# Patient Record
Sex: MALE | Race: WHITE | NOT HISPANIC OR LATINO | Employment: OTHER | URBAN - METROPOLITAN AREA
[De-identification: names, ages, dates, MRNs, and addresses within clinical notes are randomized per-mention and may not be internally consistent; named-entity substitution may affect disease eponyms.]

---

## 2017-03-09 PROBLEM — G89.29 CHRONIC PAIN: Status: ACTIVE | Noted: 2017-03-09

## 2017-03-09 PROBLEM — G47.33 OSA (OBSTRUCTIVE SLEEP APNEA): Status: ACTIVE | Noted: 2017-03-09

## 2017-03-09 PROBLEM — N18.30 CKD (CHRONIC KIDNEY DISEASE) STAGE 3, GFR 30-59 ML/MIN: Status: ACTIVE | Noted: 2017-03-09

## 2017-03-09 PROBLEM — D69.6 THROMBOCYTOPENIA (HCC): Status: ACTIVE | Noted: 2017-03-09

## 2017-03-09 PROBLEM — R09.02 HYPOXEMIA: Status: ACTIVE | Noted: 2017-03-09

## 2017-03-10 PROBLEM — R09.02 HYPOXEMIA: Status: RESOLVED | Noted: 2017-03-09 | Resolved: 2017-03-10

## 2017-04-11 PROBLEM — M75.100 ROTATOR CUFF TEAR: Status: ACTIVE | Noted: 2017-04-11

## 2017-11-15 PROBLEM — Z47.89 ORTHOPEDIC AFTERCARE: Status: ACTIVE | Noted: 2017-11-15

## 2018-01-11 PROBLEM — M17.12 PRIMARY OSTEOARTHRITIS OF LEFT KNEE: Status: ACTIVE | Noted: 2018-01-11

## 2018-05-10 PROBLEM — K57.10 DUODENAL DIVERTICULUM: Status: ACTIVE | Noted: 2018-05-10

## 2018-05-16 PROBLEM — R10.9 ABDOMINAL PAIN: Status: ACTIVE | Noted: 2018-05-16

## 2018-05-17 RX ORDER — DULOXETIN HYDROCHLORIDE 20 MG/1
20 CAPSULE, DELAYED RELEASE ORAL DAILY
Status: ON HOLD | COMMUNITY
End: 2018-07-11

## 2018-05-17 RX ORDER — ONDANSETRON 2 MG/ML
4 INJECTION INTRAMUSCULAR; INTRAVENOUS EVERY 8 HOURS PRN
Status: ON HOLD | COMMUNITY
End: 2018-05-25

## 2018-05-17 RX ORDER — LORAZEPAM 1 MG/1
1 TABLET ORAL EVERY 8 HOURS PRN
COMMUNITY
End: 2018-07-30

## 2018-05-17 RX ORDER — MORPHINE SULFATE 4 MG/ML
2-4 INJECTION, SOLUTION INTRAMUSCULAR; INTRAVENOUS EVERY 4 HOURS PRN
Status: ON HOLD | COMMUNITY
End: 2018-05-25

## 2018-05-17 RX ORDER — BISACODYL 10 MG
10 SUPPOSITORY, RECTAL RECTAL PRN
Status: ON HOLD | COMMUNITY
End: 2018-05-25

## 2018-05-17 RX ORDER — METOCLOPRAMIDE HYDROCHLORIDE 5 MG/ML
5-10 INJECTION INTRAMUSCULAR; INTRAVENOUS EVERY 8 HOURS
Status: ON HOLD | COMMUNITY
End: 2018-05-25

## 2018-05-17 RX ORDER — LOPERAMIDE HYDROCHLORIDE 2 MG/1
2 TABLET ORAL 4 TIMES DAILY PRN
COMMUNITY
End: 2018-07-30

## 2018-05-17 NOTE — OR NURSING
Pre-admit appointment completed.Pt is currently in house at [facility] Room 218. Pt states [facility] is transporting him to [place] via ambulance ride for ERCP. Pt states he will discuss with his MD tonight low platelets. States his MD said he may or may not need transfusion before/after procedure.     Spoke with nurse, Veronika, and informed of above. She will fax current MAR and send MAR with pt tomorrow to show meds taken.

## 2018-05-18 ENCOUNTER — APPOINTMENT (OUTPATIENT)
Dept: RADIOLOGY | Facility: MEDICAL CENTER | Age: 68
End: 2018-05-18
Attending: INTERNAL MEDICINE
Payer: OTHER GOVERNMENT

## 2018-05-18 ENCOUNTER — HOSPITAL ENCOUNTER (OUTPATIENT)
Facility: MEDICAL CENTER | Age: 68
End: 2018-05-18
Attending: INTERNAL MEDICINE | Admitting: INTERNAL MEDICINE
Payer: OTHER GOVERNMENT

## 2018-05-18 VITALS
OXYGEN SATURATION: 97 % | HEART RATE: 89 BPM | RESPIRATION RATE: 18 BRPM | SYSTOLIC BLOOD PRESSURE: 167 MMHG | DIASTOLIC BLOOD PRESSURE: 98 MMHG | TEMPERATURE: 97.3 F | BODY MASS INDEX: 26.09 KG/M2 | HEIGHT: 67 IN | WEIGHT: 166.23 LBS

## 2018-05-18 LAB — LIPASE SERPL-CCNC: 21 U/L (ref 7–58)

## 2018-05-18 PROCEDURE — 36415 COLL VENOUS BLD VENIPUNCTURE: CPT

## 2018-05-18 PROCEDURE — 500066 HCHG BITE BLOCK, ECT: Performed by: INTERNAL MEDICINE

## 2018-05-18 PROCEDURE — 83690 ASSAY OF LIPASE: CPT

## 2018-05-18 PROCEDURE — 160036 HCHG PACU - EA ADDL 30 MINS PHASE I: Performed by: INTERNAL MEDICINE

## 2018-05-18 PROCEDURE — 700111 HCHG RX REV CODE 636 W/ 250 OVERRIDE (IP): Performed by: INTERNAL MEDICINE

## 2018-05-18 PROCEDURE — 700105 HCHG RX REV CODE 258: Performed by: INTERNAL MEDICINE

## 2018-05-18 PROCEDURE — 160009 HCHG ANES TIME/MIN: Performed by: INTERNAL MEDICINE

## 2018-05-18 PROCEDURE — 700111 HCHG RX REV CODE 636 W/ 250 OVERRIDE (IP)

## 2018-05-18 PROCEDURE — 160002 HCHG RECOVERY MINUTES (STAT): Performed by: INTERNAL MEDICINE

## 2018-05-18 PROCEDURE — 74330 X-RAY BILE/PANC ENDOSCOPY: CPT

## 2018-05-18 PROCEDURE — 160035 HCHG PACU - 1ST 60 MINS PHASE I: Performed by: INTERNAL MEDICINE

## 2018-05-18 PROCEDURE — 160203 HCHG ENDO MINUTES - 1ST 30 MINS LEVEL 4: Performed by: INTERNAL MEDICINE

## 2018-05-18 PROCEDURE — 700117 HCHG RX CONTRAST REV CODE 255

## 2018-05-18 PROCEDURE — 160048 HCHG OR STATISTICAL LEVEL 1-5: Performed by: INTERNAL MEDICINE

## 2018-05-18 PROCEDURE — 700105 HCHG RX REV CODE 258

## 2018-05-18 PROCEDURE — 160208 HCHG ENDO MINUTES - EA ADDL 1 MIN LEVEL 4: Performed by: INTERNAL MEDICINE

## 2018-05-18 PROCEDURE — 110371 HCHG SHELL REV 272: Performed by: INTERNAL MEDICINE

## 2018-05-18 RX ORDER — ONDANSETRON 2 MG/ML
INJECTION INTRAMUSCULAR; INTRAVENOUS
Status: COMPLETED
Start: 2018-05-18 | End: 2018-05-18

## 2018-05-18 RX ORDER — HYDRALAZINE HYDROCHLORIDE 20 MG/ML
INJECTION INTRAMUSCULAR; INTRAVENOUS
Status: COMPLETED
Start: 2018-05-18 | End: 2018-05-18

## 2018-05-18 RX ORDER — SODIUM CHLORIDE, SODIUM LACTATE, POTASSIUM CHLORIDE, CALCIUM CHLORIDE 600; 310; 30; 20 MG/100ML; MG/100ML; MG/100ML; MG/100ML
INJECTION, SOLUTION INTRAVENOUS
Status: DISCONTINUED | OUTPATIENT
Start: 2018-05-18 | End: 2018-05-18 | Stop reason: HOSPADM

## 2018-05-18 RX ORDER — HYDROMORPHONE HYDROCHLORIDE 2 MG/ML
INJECTION, SOLUTION INTRAMUSCULAR; INTRAVENOUS; SUBCUTANEOUS
Status: COMPLETED
Start: 2018-05-18 | End: 2018-05-18

## 2018-05-18 RX ADMIN — FENTANYL CITRATE 50 MCG: 50 INJECTION, SOLUTION INTRAMUSCULAR; INTRAVENOUS at 16:49

## 2018-05-18 RX ADMIN — ONDANSETRON 4 MG: 2 INJECTION INTRAMUSCULAR; INTRAVENOUS at 16:45

## 2018-05-18 RX ADMIN — HYDROMORPHONE HYDROCHLORIDE 0.5 MG: 2 INJECTION, SOLUTION INTRAMUSCULAR; INTRAVENOUS; SUBCUTANEOUS at 19:16

## 2018-05-18 RX ADMIN — FENTANYL CITRATE 75 MCG: 50 INJECTION, SOLUTION INTRAMUSCULAR; INTRAVENOUS at 18:38

## 2018-05-18 RX ADMIN — HYDROMORPHONE HYDROCHLORIDE 0.5 MG: 2 INJECTION, SOLUTION INTRAMUSCULAR; INTRAVENOUS; SUBCUTANEOUS at 19:29

## 2018-05-18 RX ADMIN — FENTANYL CITRATE 50 MCG: 50 INJECTION, SOLUTION INTRAMUSCULAR; INTRAVENOUS at 17:28

## 2018-05-18 RX ADMIN — PIPERACILLIN AND TAZOBACTAM 3.38 G: 3; .375 INJECTION, POWDER, FOR SOLUTION INTRAVENOUS at 14:18

## 2018-05-18 RX ADMIN — SODIUM CHLORIDE, SODIUM LACTATE, POTASSIUM CHLORIDE, CALCIUM CHLORIDE: 600; 310; 30; 20 INJECTION, SOLUTION INTRAVENOUS at 13:20

## 2018-05-18 RX ADMIN — HYDRALAZINE HYDROCHLORIDE 5 MG: 20 INJECTION, SOLUTION INTRAMUSCULAR; INTRAVENOUS at 17:22

## 2018-05-18 RX ADMIN — FENTANYL CITRATE 25 MCG: 50 INJECTION, SOLUTION INTRAMUSCULAR; INTRAVENOUS at 16:37

## 2018-05-18 RX ADMIN — HYDRALAZINE HYDROCHLORIDE 5 MG: 20 INJECTION, SOLUTION INTRAMUSCULAR; INTRAVENOUS at 18:48

## 2018-05-18 RX ADMIN — HYDROMORPHONE HYDROCHLORIDE 0.5 MG: 2 INJECTION, SOLUTION INTRAMUSCULAR; INTRAVENOUS; SUBCUTANEOUS at 19:05

## 2018-05-18 RX ADMIN — HYDRALAZINE HYDROCHLORIDE 10 MG: 20 INJECTION, SOLUTION INTRAMUSCULAR; INTRAVENOUS at 18:38

## 2018-05-18 RX ADMIN — FENTANYL CITRATE 50 MCG: 50 INJECTION, SOLUTION INTRAMUSCULAR; INTRAVENOUS at 18:00

## 2018-05-18 ASSESSMENT — PAIN SCALES - GENERAL
PAINLEVEL_OUTOF10: 7
PAINLEVEL_OUTOF10: 5
PAINLEVEL_OUTOF10: 8
PAINLEVEL_OUTOF10: 8
PAINLEVEL_OUTOF10: 7
PAINLEVEL_OUTOF10: 8
PAINLEVEL_OUTOF10: 7
PAINLEVEL_OUTOF10: 5
PAINLEVEL_OUTOF10: 8
PAINLEVEL_OUTOF10: 6
PAINLEVEL_OUTOF10: 8
PAINLEVEL_OUTOF10: 8
PAINLEVEL_OUTOF10: 9
PAINLEVEL_OUTOF10: 8
PAINLEVEL_OUTOF10: 8

## 2018-05-18 NOTE — PROCEDURES
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 Prince Tracey (general)    Premedications: zosyn 3.375 grams IV    Consent: Risks, benefits, and alternatives were discussed with patient.  Consenting person was given an opportunity to ask questions and discuss other options.  Risks including but not limited to pancreatitis, contrast reaction, radiation exposure, retained choledocholithiasis, possible need for temporary stent placement, perforation, infection, bleeding, missed lesion(s), cardiac and/or pulmonary event, aspiration, stroke, possible need for surgery and/or interventional radiology, hospitalization possibly prolonged, discomfort, unsuccessful and/or incomplete procedure, ineffective therapy and/or persistent symptoms, possible need for repeat procedures and/or additional testings, damage to adjacent organs and/or vascular structures, medication reaction, disability, death, and other adverse events possibly life-threatening.  Discussion was undertaken with Layman's terms.  Consenting person stated understanding and acceptance of these risks, and wished to proceed.  Informed consent was given in clear state of mind.    Endoscopic procedures in detail:  ERCP scope was inserted from mouth to 2nd portion of the duodenum.  Biliary duct was selectively cannulated on the third attempt, successful free cannulation was achieved.  Cholangiogram was injected and completed.  Biliary sphincterotomy was  "performed with a bow papillotome with subsequent balloon common bile duct stones extraction, clearance was complete without evidence of any residual common bile duct stones on completion cholangiogram. Saline was used to flush the common bile duct to remove fragmented stone debris.  The C-arm was moved and rotated on rainbow axis to optimize imaging of intrahepatic biliary systems in different angles to avoid missing lesions/strictures with ductal overlap and/or image angulation. The balloon was inflated within the right and left intrahepatic ducts and dragged through the entire length of the bile duct out the biliary os in order to minimize risk of retained stones. The ERCP scope was removed from duodenum to also image the common bile duct \"behind\" the ERCP scope.  Of note, no radiologist was present to help obtain nor read ERCP images.  I was the sole physician who obtained and performed interpretation of static and dynamic ERCP fluoroscopic x-ray images, no over-read was requested from the radiologist nor was it necessary.  There was suction of insufflated air and stomach fluid contents upon removal.    Procedure times:  - In-room 15:48  - Start 15:58  - Completed 16:13  - Out of room 16:26    Endoscopic Retrograde CholangioPancreatography (ERCP) Findings:  - Ampulla of Vater: located in 2nd portion of duodenum \"below\" a large duodenal diverticulum.  - Cholangiogram: 9mm common bile duct yellowish-brown stone fragmented with balloon and removed after biliary sphincterotomy was performed, clearance complete without residual stones on completion cholangiogram.  - Pancreatogram: Intentionally not injected nor cannulated.    Impression:  1. Choledocholithiasis, extracted.  2. Biliary sphincterotomy performed  3. Duodenal juxta-ampullary diverticulum    Recommendations:   1.  Routine post-endoscopy anesthesia recovery care.  Transfer patient back to [facility] under the care of Dr. Shyla Woods when he is " awake, alert and comfortable, when recovery criteria are met.  Aspiration and fall precautions x 24 hours.   2. Avoid NSAIDs and blood-thinners for 5 days  3. Clear liquid diet, NPO after midnight for lap cisco.  4. Follow-up with as needed with established GI Dr. Umanzor.   5. I spoke to patient and recovery nurse about impression, diagnosis and recommendations.  6. I asked nursing staff to print my procedure note so that it may accompany paper chart back to [facility] in case they cannot access my note in Epic.

## 2018-05-19 PROBLEM — K80.50 CHOLEDOCHOLITHIASIS: Status: ACTIVE | Noted: 2018-05-19

## 2018-05-19 NOTE — CONSULTS
[facility]    DATE OF SERVICE:  05/18/2018    REFERRING PHYSICIAN:  Shyla Woods MD.    REASON FOR CONSULTATION:  Choledocholithiasis.    HISTORY OF PRESENT ILLNESS:  A 67-year-old  male with intact   gallbladder, presented to [facility] with 7 weeks of epigastric abdominal   pain.  He has described his abdominal pain as sharp in quality, radiating to   his back, associated with nausea, vomiting, fairly severe, as bad as passing a   kidney stone.  His symptoms were worsened with eating fatty foods, especially   hamburgers and French fries.  His pain was colicky in nature, but persistent   for several days prior to admission.  He lost about 15 pounds of weight   unintentionally over the last 2 months due to his abdominal pain symptoms.  He   does have chronic heartburn and reflux symptoms for which he takes   medications and this felt different than those pain symptoms.  He originally   presented to [facility] and a CT scan revealed a distal common biliary   duct filling defect as well as duodenal diverticulum.  His LFTs showed   initially normal AST and ALT, alkaline phosphatase, total bilirubin.    Ultrasound showed gallbladder sludge.  They had plans to proceed with   laparoscopic cholecystectomy after ERCP.  Today, he is transferred from [facility] for his procedure and plans to be transferred back afterwards.    Otherwise, patient denies further modifying factors, associated symptoms, or   timing issues with his complaints.    PAST MEDICAL HISTORY:  Chronic thrombocytopenia, stage III kidney disease,   anxiety, osteoarthritis, GERD, possible history of myocardial infarction at   least 8 years ago if not greater, obstructive sleep apnea, uses CPAP, chronic   leg pain.    PAST SURGICAL HISTORY:  Multiple knee surgeries, shoulder rotator cuff   surgeries, nasal septoplasty, skin grafts.    SOCIAL HISTORY:  Retired .  Denied tobacco or illicit drug   use and  tattoos.  Drinks alcohol rarely.    FAMILY HISTORY:  Positive for coronary artery disease and arthritis.  Denied   family history of gastric cancer, colorectal cancer, colorectal polyps.    REVIEW OF SYSTEMS:  As per HPI, past medical history, past surgical history   sections, otherwise greater than 10 systems negative including constitutional,   head, ears, eyes, nose, throat, pulmonary, cardiovascular, GI, genital,   rectal, hematological, endocrine, rheumatological, psychiatric, neurological,   dermatological, hematological/oncological, musculoskeletal.    PHYSICAL EXAMINATION:  VITAL SIGNS:  Temperature 98.2, respirations 18, pulse 64, blood pressure   154/104, 5 feet 7 inches tall, BMI of 26, weight of 169 pounds.  GENERAL:  A well-developed, well-nourished white male, in no apparent distress   except for abdominal pain symptoms.  HEENT:  Head atraumatic, normocephalic.  Eyes, extraocular movements intact,   nonicteric sclerae.  Nose, no erythema, no discharge.  Mouth, no erythema or   discharge, no thrush noted.  Mallampati of 2.  NECK:  Supple.  No lymphadenopathy or JVD.  PULMONARY:  Clear to auscultation bilaterally.  CARDIOVASCULAR:  Regular rate and rhythm without murmur, rub, or gallop.  ABDOMEN:  Nondistended.  Positive bowel sounds.  Markedly tender at epigastrium.  No   rebound, no appreciable hepatosplenomegaly, ascites, ecchymosis.  DERMATOLOGICAL:  No spider angiomas, no palmar erythema.  NEUROLOGICAL:  No focal deficits appreciated.  No asterixis.  MUSCULOSKELETAL:  Moving all extremities.  Strength 5/5 throughout.  PSYCHIATRIC:  Appropriate mood, affect, interaction, and insight.    LABORATORY DATA:  WBC 4.9, hemoglobin 15.5, hematocrit 44.9, platelet count   69.  Sodium 139, potassium 4, chloride 108, bicarb 26, BUN 10, creatinine 1.6,   glucose 115, GFR 43, calcium 8.4, AST 59, ALT 64, alkaline phosphatase 77,   total bilirubin 0.4, albumin 3.1, magnesium 2, lipase 101.  INR 1.11.  Blood   type  O positive.    IMAGING:  As per HPI.  MRCP, 05/16/2018, shows a 9-mm stone within common   biliary duct.  EKG was performed on 05/04/2018, records unavailable.    IMPRESSION:  A 67-year-old  male with intact gallbladder, presents   with choledocholithiasis without evidence of cholangitis with intractable   epigastric abdominal pain, nausea, vomiting, warranting ERCP prior to   laparoscopic cholecystectomy.    Significant comorbidities include choledocholithiasis, obstructive sleep   apnea, chronic kidney disease and chronic thrombocytopenia, which increase the   patient's risk for bleeding and adverse events, but ERCP is warranted and   patient stated acceptance of these increased risk.    PROBLEMS  1.  Choledocholithiasis.  2.  Abnormal MRCP and CT biliary duct imaging.  3.  Epigastric abdominal pain.  4.  Gallstones, epigastric abdominal pain.  5.  Chronic thrombocytopenia.  6.  Sleep apnea.  7.  Multiple comorbidities as per above.    RECOMMENDATIONS:  1.  Zosyn 3.375 grams IV prior to proceeding with ERCP.  2.  Endoscopic retrograde cholangiopancreatography with biliary sphincterotomy   and choledocholithiasis extraction with possible temporary stent placement.    Plan to proceed under anesthesia support.  Risks, benefits, and alternatives   were discussed with patient.  He was given opportunity to ask questions and   discuss other options.  Risks including but not limited to perforation,   infection, bleeding, missed lesions, possible need for surgery,   cardiopulmonary event, aspiration, stroke, hospitalization possibly prolonged,   pancreatitis, contrast reaction, radiation exposure, failed to incomplete   ERCP, retained choledocholithiasis, possible need for temporary stent   insertion, stent migration and/or occlusion if inserted, and other potential   life-threatening complications.  Discussion was undertaken in layman's terms.    Patient stated clear understanding and acceptance of risk and  wished to   proceed with procedures as detailed in this note.  Consent was given by   patient in clear state of mind.    Dear Dr. Joe Kelly:    Thank you for asking me to evaluate the patient in consultation.  Please refer   to my consult note as per above for details of recommendations.       ____________________________________     MD FRANDY CORDERO / ADÁN    DD:  05/18/2018 13:50:35  DT:  05/18/2018 14:47:18    D#:  [id number]  Job#:  [id number]    cc: JOE KELLY MD, JOSÉ MIGUEL GRULLON MD

## 2019-04-25 ENCOUNTER — HOSPITAL ENCOUNTER (OUTPATIENT)
Dept: HOSPITAL 8 - STAR | Age: 69
Discharge: HOME | End: 2019-04-25
Attending: ORTHOPAEDIC SURGERY
Payer: MEDICARE

## 2019-04-25 DIAGNOSIS — R79.89: ICD-10-CM

## 2019-04-25 DIAGNOSIS — Z01.818: ICD-10-CM

## 2019-04-25 DIAGNOSIS — T84.84XS: Primary | ICD-10-CM

## 2019-04-25 DIAGNOSIS — Z96.652: ICD-10-CM
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
## 2019-04-25 LAB
<PLATELET ESTIMATE>: (no result)
ANION GAP SERPL CALC-SCNC: 5 MMOL/L (ref 5–15)
ANISOCYTOSIS BLD QL SMEAR: (no result)
APTT BLD: 29 SECONDS (ref 25–31)
BASOPHILS # BLD AUTO: 0.03 X10^3/UL (ref 0–0.1)
BASOPHILS NFR BLD AUTO: 1 % (ref 0–1)
CALCIUM SERPL-MCNC: 9.1 MG/DL (ref 8.5–10.1)
CHLORIDE SERPL-SCNC: 108 MMOL/L (ref 98–107)
CREAT SERPL-MCNC: 1.61 MG/DL (ref 0.7–1.3)
CULTURE INDICATED?: NO
EOSINOPHIL # BLD AUTO: 0.06 X10^3/UL (ref 0–0.4)
EOSINOPHIL NFR BLD AUTO: 1 % (ref 1–7)
ERYTHROCYTE [DISTWIDTH] IN BLOOD BY AUTOMATED COUNT: 15.1 % (ref 9.4–14.8)
EST. AVERAGE GLUCOSE BLD GHB EST-MCNC: 114 MG/DL (ref 0–126)
HBA1C MFR BLD: 5.6 % (ref 4.2–6.3)
INR PPP: 1.08 (ref 0.93–1.1)
LG PLATELETS BLD QL SMEAR: (no result)
LYMPHOCYTES # BLD AUTO: 0.77 X10^3/UL (ref 1–3.4)
LYMPHOCYTES NFR BLD AUTO: 15 % (ref 22–44)
MCH RBC QN AUTO: 32 PG (ref 27.5–34.5)
MCHC RBC AUTO-ENTMCNC: 33.8 G/DL (ref 33.2–36.2)
MCV RBC AUTO: 94.6 FL (ref 81–97)
MD: (no result)
MICROSCOPIC: (no result)
MONOCYTES # BLD AUTO: 0.51 X10^3/UL (ref 0.2–0.8)
MONOCYTES NFR BLD AUTO: 10 % (ref 2–9)
NEUTROPHILS # BLD AUTO: 3.82 X10^3/UL (ref 1.8–6.8)
NEUTROPHILS NFR BLD AUTO: 74 % (ref 42–75)
PLATELET # BLD AUTO: 64 X10^3/UL (ref 130–400)
PMV BLD AUTO: 10 FL (ref 7.4–10.4)
PROTHROMBIN TIME: 11.3 SECONDS (ref 9.6–11.5)
RBC # BLD AUTO: 5.58 X10^6/UL (ref 4.38–5.82)

## 2019-04-25 PROCEDURE — 85730 THROMBOPLASTIN TIME PARTIAL: CPT

## 2019-04-25 PROCEDURE — 85025 COMPLETE CBC W/AUTO DIFF WBC: CPT

## 2019-04-25 PROCEDURE — 83036 HEMOGLOBIN GLYCOSYLATED A1C: CPT

## 2019-04-25 PROCEDURE — 81003 URINALYSIS AUTO W/O SCOPE: CPT

## 2019-04-25 PROCEDURE — 93005 ELECTROCARDIOGRAM TRACING: CPT

## 2019-04-25 PROCEDURE — 80048 BASIC METABOLIC PNL TOTAL CA: CPT

## 2019-04-25 PROCEDURE — 85610 PROTHROMBIN TIME: CPT

## 2019-04-25 PROCEDURE — 87081 CULTURE SCREEN ONLY: CPT

## 2019-04-25 PROCEDURE — 36415 COLL VENOUS BLD VENIPUNCTURE: CPT

## 2019-04-25 PROCEDURE — 87806 HIV AG W/HIV1&2 ANTB W/OPTIC: CPT

## 2019-04-25 PROCEDURE — G0475 HIV COMBINATION ASSAY: HCPCS

## 2019-04-29 ENCOUNTER — HOSPITAL ENCOUNTER (INPATIENT)
Dept: HOSPITAL 8 - ORIP | Age: 69
LOS: 1 days | Discharge: HOME | DRG: 468 | End: 2019-04-30
Attending: ORTHOPAEDIC SURGERY | Admitting: ORTHOPAEDIC SURGERY
Payer: MEDICARE

## 2019-04-29 VITALS — WEIGHT: 188.27 LBS | HEIGHT: 67 IN | BODY MASS INDEX: 29.55 KG/M2

## 2019-04-29 VITALS — SYSTOLIC BLOOD PRESSURE: 127 MMHG | DIASTOLIC BLOOD PRESSURE: 79 MMHG

## 2019-04-29 VITALS — SYSTOLIC BLOOD PRESSURE: 128 MMHG | DIASTOLIC BLOOD PRESSURE: 78 MMHG
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
## 2019-04-29 DIAGNOSIS — T84.033A: Primary | ICD-10-CM
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
## 2019-04-29 DIAGNOSIS — G89.29: ICD-10-CM
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
## 2019-04-29 PROCEDURE — 0SRD069 REPLACEMENT OF LEFT KNEE JOINT WITH OXIDIZED ZIRCONIUM ON POLYETHYLENE SYNTHETIC SUBSTITUTE, CEMENTED, OPEN APPROACH: ICD-10-PCS | Performed by: ORTHOPAEDIC SURGERY

## 2019-04-29 PROCEDURE — C1776 JOINT DEVICE (IMPLANTABLE): HCPCS

## 2019-04-29 PROCEDURE — 85014 HEMATOCRIT: CPT

## 2019-04-29 PROCEDURE — 0SPD0JZ REMOVAL OF SYNTHETIC SUBSTITUTE FROM LEFT KNEE JOINT, OPEN APPROACH: ICD-10-PCS | Performed by: ORTHOPAEDIC SURGERY

## 2019-04-29 PROCEDURE — 36415 COLL VENOUS BLD VENIPUNCTURE: CPT

## 2019-04-29 PROCEDURE — C1713 ANCHOR/SCREW BN/BN,TIS/BN: HCPCS

## 2019-04-29 PROCEDURE — 5A09357 ASSISTANCE WITH RESPIRATORY VENTILATION, LESS THAN 24 CONSECUTIVE HOURS, CONTINUOUS POSITIVE AIRWAY PRESSURE: ICD-10-PCS | Performed by: ORTHOPAEDIC SURGERY

## 2019-04-29 PROCEDURE — 85018 HEMOGLOBIN: CPT

## 2019-04-29 PROCEDURE — 3E0T3BZ INTRODUCTION OF ANESTHETIC AGENT INTO PERIPHERAL NERVES AND PLEXI, PERCUTANEOUS APPROACH: ICD-10-PCS | Performed by: ORTHOPAEDIC SURGERY

## 2019-04-29 RX ADMIN — DOCUSATE SODIUM SCH MG: 100 CAPSULE, LIQUID FILLED ORAL at 21:30

## 2019-04-29 RX ADMIN — CEFAZOLIN SODIUM SCH MLS/HR: 1 SOLUTION INTRAVENOUS at 21:30

## 2019-04-29 RX ADMIN — FENTANYL CITRATE PRN MCG: 50 INJECTION INTRAMUSCULAR; INTRAVENOUS at 15:36

## 2019-04-29 RX ADMIN — KETOROLAC TROMETHAMINE SCH MG: 30 INJECTION, SOLUTION INTRAMUSCULAR at 18:21

## 2019-04-29 RX ADMIN — GABAPENTIN SCH MG: 300 CAPSULE ORAL at 21:30

## 2019-04-29 RX ADMIN — HYDROMORPHONE HYDROCHLORIDE PRN MG: 2 INJECTION INTRAMUSCULAR; INTRAVENOUS; SUBCUTANEOUS at 15:47

## 2019-04-29 RX ADMIN — FENTANYL CITRATE PRN MCG: 50 INJECTION INTRAMUSCULAR; INTRAVENOUS at 15:43

## 2019-04-29 RX ADMIN — HYDROMORPHONE HYDROCHLORIDE PRN MG: 2 INJECTION INTRAMUSCULAR; INTRAVENOUS; SUBCUTANEOUS at 15:41

## 2019-04-29 RX ADMIN — DEXTROSE, SODIUM CHLORIDE, AND POTASSIUM CHLORIDE SCH MLS/HR: 5; .45; .15 INJECTION INTRAVENOUS at 18:22

## 2019-04-29 RX ADMIN — OXYCODONE HYDROCHLORIDE PRN MG: 5 TABLET ORAL at 21:30

## 2019-04-30 VITALS — DIASTOLIC BLOOD PRESSURE: 79 MMHG | SYSTOLIC BLOOD PRESSURE: 128 MMHG

## 2019-04-30 VITALS — SYSTOLIC BLOOD PRESSURE: 111 MMHG | DIASTOLIC BLOOD PRESSURE: 72 MMHG

## 2019-04-30 RX ADMIN — GABAPENTIN SCH MG: 300 CAPSULE ORAL at 08:00

## 2019-04-30 RX ADMIN — OXYCODONE HYDROCHLORIDE PRN MG: 5 TABLET ORAL at 12:53

## 2019-04-30 RX ADMIN — DOCUSATE SODIUM SCH MG: 100 CAPSULE, LIQUID FILLED ORAL at 08:01

## 2019-04-30 RX ADMIN — CEFAZOLIN SODIUM SCH MLS/HR: 1 SOLUTION INTRAVENOUS at 05:36

## 2019-04-30 RX ADMIN — ASPIRIN SCH MG: 81 TABLET, COATED ORAL at 05:37

## 2019-04-30 RX ADMIN — ASPIRIN SCH MG: 81 TABLET, COATED ORAL at 08:02

## 2019-04-30 RX ADMIN — DEXTROSE, SODIUM CHLORIDE, AND POTASSIUM CHLORIDE SCH MLS/HR: 5; .45; .15 INJECTION INTRAVENOUS at 03:30

## 2019-04-30 RX ADMIN — KETOROLAC TROMETHAMINE SCH MG: 30 INJECTION, SOLUTION INTRAMUSCULAR at 10:56

## 2019-04-30 RX ADMIN — KETOROLAC TROMETHAMINE SCH MG: 30 INJECTION, SOLUTION INTRAMUSCULAR at 02:20

## 2019-04-30 RX ADMIN — OXYCODONE HYDROCHLORIDE PRN MG: 5 TABLET ORAL at 05:37

## 2019-04-30 RX ADMIN — OXYCODONE HYDROCHLORIDE PRN MG: 5 TABLET ORAL at 01:36

## 2020-03-02 PROBLEM — H90.3 SENSORINEURAL HEARING LOSS (SNHL) OF BOTH EARS: Status: ACTIVE | Noted: 2020-03-02

## 2022-07-18 PROBLEM — M77.8 EXTENSOR CARPI ULNARIS TENDINITIS: Status: ACTIVE | Noted: 2022-07-18

## 2022-09-12 PROBLEM — R22.31 FINGER MASS, RIGHT: Status: ACTIVE | Noted: 2022-09-12

## 2022-09-12 PROBLEM — R22.31 MASS OF SKIN OF RIGHT THUMB: Status: ACTIVE | Noted: 2022-09-12

## 2022-12-08 PROBLEM — Z47.89 ORTHOPEDIC AFTERCARE: Status: RESOLVED | Noted: 2017-11-15 | Resolved: 2022-12-08

## 2022-12-08 PROBLEM — R10.9 ABDOMINAL PAIN: Status: RESOLVED | Noted: 2018-05-16 | Resolved: 2022-12-08

## 2022-12-08 PROBLEM — I47.10 SVT (SUPRAVENTRICULAR TACHYCARDIA) (HCC): Status: ACTIVE | Noted: 2022-12-08

## 2023-01-09 PROBLEM — G62.9 NEUROPATHY: Status: ACTIVE | Noted: 2023-01-09

## 2023-10-03 PROBLEM — I47.9 PAROXYSMAL TACHYCARDIA (HCC): Status: ACTIVE | Noted: 2023-10-03

## 2023-10-03 PROBLEM — B00.9 HERPES SIMPLEX INFECTION: Status: ACTIVE | Noted: 2023-10-03

## 2023-10-03 PROBLEM — E78.2 MIXED HYPERLIPIDEMIA: Status: ACTIVE | Noted: 2023-10-03

## 2023-10-03 PROBLEM — N18.32 STAGE 3B CHRONIC KIDNEY DISEASE: Status: ACTIVE | Noted: 2017-03-09

## (undated) DEVICE — SYRINGE SAFETY 10 ML 18 GA X 1 1/2 BLUNT LL (100/BX 4BX/CA)

## (undated) DEVICE — TUBE SUCTION YANKAUER  1/4 X 6FT (20EA/CA)"

## (undated) DEVICE — BLOCK BITE ENDOSCOPIC 2809 - (100/BX) INTERMEDIATE

## (undated) DEVICE — BASIN EMESIS DISP. - (250/CA)

## (undated) DEVICE — NEPTUNE 4 PORT MANIFOLD - (20/PK)

## (undated) DEVICE — CANNULA W/ SUPPLY TUBING O2 - (50/CA)

## (undated) DEVICE — TUBING CLEARLINK DUO-VENT - C-FLO (48EA/CA)

## (undated) DEVICE — KIT  I.V. START (100EA/CA)

## (undated) DEVICE — SET EXTENSION WITH 2 PORTS (48EA/CA) ***PART #2C8610 IS A SUBSTITUTE*****

## (undated) DEVICE — GLOVE, LITE (PAIR)

## (undated) DEVICE — CATHETER IV SAFETY 20 GA X 1-1/4 (50/BX)

## (undated) DEVICE — SYRINGE SAFETY 3 ML 18 GA X 1 1/2 BLUNT LL (100/BX 8BX/CA)

## (undated) DEVICE — SYRINGE DISP. 50CC LS - (40/BX)

## (undated) DEVICE — EXTRACTOR PRO XL 9-12 MM ABOVE

## (undated) DEVICE — SPHINCTERTOME TRUETOME 44 20MM PRELOAD JAG 035IN

## (undated) DEVICE — GOWN SURGEONS LARGE - (32/CA)

## (undated) DEVICE — ELECTRODE 850 FOAM ADHESIVE - HYDROGEL RADIOTRNSPRNT (50/PK)

## (undated) DEVICE — BITE BLOCK ADULT 60FR (100EA/CA)

## (undated) DEVICE — SPONGE GAUZE NON-STERILE 4X4 - (2000/CA 10PK/CA)

## (undated) DEVICE — MASK WITH FACE SHIELD (25/BX 4BX/CA)

## (undated) DEVICE — SYRINGE SAFETY 5 ML 18 GA X 1-1/2 BLUNT LL (100/BX 4BX/CA)